# Patient Record
Sex: FEMALE | Race: WHITE | NOT HISPANIC OR LATINO
[De-identification: names, ages, dates, MRNs, and addresses within clinical notes are randomized per-mention and may not be internally consistent; named-entity substitution may affect disease eponyms.]

---

## 2018-02-22 ENCOUNTER — RECORDS - HEALTHEAST (OUTPATIENT)
Dept: ADMINISTRATIVE | Facility: OTHER | Age: 53
End: 2018-02-22

## 2018-03-15 ENCOUNTER — RECORDS - HEALTHEAST (OUTPATIENT)
Dept: ADMINISTRATIVE | Facility: OTHER | Age: 53
End: 2018-03-15

## 2018-03-16 ENCOUNTER — RECORDS - HEALTHEAST (OUTPATIENT)
Dept: ADMINISTRATIVE | Facility: OTHER | Age: 53
End: 2018-03-16

## 2018-03-19 ENCOUNTER — COMMUNICATION - HEALTHEAST (OUTPATIENT)
Dept: ADMINISTRATIVE | Facility: CLINIC | Age: 53
End: 2018-03-19

## 2018-04-11 ENCOUNTER — OFFICE VISIT - HEALTHEAST (OUTPATIENT)
Dept: INFECTIOUS DISEASES | Facility: CLINIC | Age: 53
End: 2018-04-11

## 2018-04-11 ENCOUNTER — AMBULATORY - HEALTHEAST (OUTPATIENT)
Dept: LAB | Facility: CLINIC | Age: 53
End: 2018-04-11

## 2018-04-11 ENCOUNTER — COMMUNICATION - HEALTHEAST (OUTPATIENT)
Dept: TELEHEALTH | Facility: CLINIC | Age: 53
End: 2018-04-11

## 2018-04-11 DIAGNOSIS — B25.9 CMV COLITIS (H): ICD-10-CM

## 2018-04-11 DIAGNOSIS — A08.39 CMV COLITIS (H): ICD-10-CM

## 2018-04-11 DIAGNOSIS — Z94.0 H/O KIDNEY TRANSPLANT: ICD-10-CM

## 2018-04-11 LAB — C REACTIVE PROTEIN LHE: 9.9 MG/DL (ref 0–0.8)

## 2018-04-11 RX ORDER — FUROSEMIDE 40 MG
40 TABLET ORAL 2 TIMES DAILY
Status: SHIPPED | COMMUNITY
Start: 2018-03-26

## 2018-04-11 RX ORDER — AMLODIPINE BESYLATE 5 MG/1
5 TABLET ORAL DAILY
Status: SHIPPED | COMMUNITY
Start: 2018-04-11

## 2018-04-11 RX ORDER — CODEINE PHOSPHATE AND GUAIFENESIN 10; 100 MG/5ML; MG/5ML
SOLUTION ORAL
Status: SHIPPED | COMMUNITY
Start: 2018-04-11

## 2018-04-11 ASSESSMENT — MIFFLIN-ST. JEOR: SCORE: 1066.25

## 2018-04-12 ENCOUNTER — COMMUNICATION - HEALTHEAST (OUTPATIENT)
Dept: ADMINISTRATIVE | Facility: CLINIC | Age: 53
End: 2018-04-12

## 2018-04-12 LAB
CMV DNA SPECIMEN TYPE: ABNORMAL
CMV QUANT IU/ML: <137 [IU]/ML
LOG IU/ML OF CMVQNT: <2.1 {LOG_IU}/ML

## 2018-04-19 ENCOUNTER — AMBULATORY - HEALTHEAST (OUTPATIENT)
Dept: LAB | Facility: HOSPITAL | Age: 53
End: 2018-04-19

## 2018-04-19 DIAGNOSIS — Z94.0 H/O KIDNEY TRANSPLANT: ICD-10-CM

## 2018-04-19 DIAGNOSIS — A08.39 CMV COLITIS (H): ICD-10-CM

## 2018-04-19 DIAGNOSIS — B25.9 CMV COLITIS (H): ICD-10-CM

## 2018-04-19 LAB
ANION GAP SERPL CALCULATED.3IONS-SCNC: 14 MMOL/L (ref 5–18)
BASOPHILS # BLD AUTO: 0 THOU/UL (ref 0–0.2)
BASOPHILS NFR BLD AUTO: 0 % (ref 0–2)
BUN SERPL-MCNC: 87 MG/DL (ref 8–22)
C REACTIVE PROTEIN LHE: 9.3 MG/DL (ref 0–0.8)
CALCIUM SERPL-MCNC: 7.5 MG/DL (ref 8.5–10.5)
CHLORIDE BLD-SCNC: 108 MMOL/L (ref 98–107)
CO2 SERPL-SCNC: 17 MMOL/L (ref 22–31)
CREAT SERPL-MCNC: 1.99 MG/DL (ref 0.6–1.1)
EOSINOPHIL # BLD AUTO: 0 THOU/UL (ref 0–0.4)
EOSINOPHIL NFR BLD AUTO: 0 % (ref 0–6)
ERYTHROCYTE [DISTWIDTH] IN BLOOD BY AUTOMATED COUNT: 18.1 % (ref 11–14.5)
GFR SERPL CREATININE-BSD FRML MDRD: 26 ML/MIN/1.73M2
GLUCOSE BLD-MCNC: 103 MG/DL (ref 70–125)
HCT VFR BLD AUTO: 32.3 % (ref 35–47)
HGB BLD-MCNC: 9.3 G/DL (ref 12–16)
LYMPHOCYTES # BLD AUTO: 2.5 THOU/UL (ref 0.8–4.4)
LYMPHOCYTES NFR BLD AUTO: 41 % (ref 20–40)
MCH RBC QN AUTO: 23.4 PG (ref 27–34)
MCHC RBC AUTO-ENTMCNC: 28.8 G/DL (ref 32–36)
MCV RBC AUTO: 81 FL (ref 80–100)
MONOCYTES # BLD AUTO: 0.2 THOU/UL (ref 0–0.9)
MONOCYTES NFR BLD AUTO: 4 % (ref 2–10)
NEUTROPHILS # BLD AUTO: 3.2 THOU/UL (ref 2–7.7)
NEUTROPHILS NFR BLD AUTO: 54 % (ref 50–70)
PLATELET # BLD AUTO: 251 THOU/UL (ref 140–440)
PMV BLD AUTO: 7.9 FL (ref 8.5–12.5)
POTASSIUM BLD-SCNC: 5.1 MMOL/L (ref 3.5–5)
RBC # BLD AUTO: 3.97 MILL/UL (ref 3.8–5.4)
SODIUM SERPL-SCNC: 139 MMOL/L (ref 136–145)
WBC: 5.9 THOU/UL (ref 4–11)

## 2018-04-20 LAB
CMV DNA SPECIMEN TYPE: NORMAL
CMV QUANT IU/ML: NORMAL [IU]/ML
LOG IU/ML OF CMVQNT: NORMAL {LOG_IU}/ML

## 2018-04-26 ENCOUNTER — AMBULATORY - HEALTHEAST (OUTPATIENT)
Dept: LAB | Facility: HOSPITAL | Age: 53
End: 2018-04-26

## 2018-04-26 DIAGNOSIS — B25.9 CMV COLITIS (H): ICD-10-CM

## 2018-04-26 DIAGNOSIS — Z94.0 H/O KIDNEY TRANSPLANT: ICD-10-CM

## 2018-04-26 DIAGNOSIS — A08.39 CMV COLITIS (H): ICD-10-CM

## 2018-04-26 LAB
ANION GAP SERPL CALCULATED.3IONS-SCNC: 13 MMOL/L (ref 5–18)
BASOPHILS # BLD AUTO: 0 THOU/UL (ref 0–0.2)
BASOPHILS NFR BLD AUTO: 0 % (ref 0–2)
BUN SERPL-MCNC: 83 MG/DL (ref 8–22)
C REACTIVE PROTEIN LHE: 7.4 MG/DL (ref 0–0.8)
CALCIUM SERPL-MCNC: 7.7 MG/DL (ref 8.5–10.5)
CHLORIDE BLD-SCNC: 108 MMOL/L (ref 98–107)
CMV DNA SPECIMEN TYPE: NORMAL
CMV QUANT IU/ML: NORMAL [IU]/ML
CO2 SERPL-SCNC: 21 MMOL/L (ref 22–31)
CREAT SERPL-MCNC: 1.99 MG/DL (ref 0.6–1.1)
EOSINOPHIL # BLD AUTO: 0 THOU/UL (ref 0–0.4)
EOSINOPHIL NFR BLD AUTO: 0 % (ref 0–6)
ERYTHROCYTE [DISTWIDTH] IN BLOOD BY AUTOMATED COUNT: 17.7 % (ref 11–14.5)
GFR SERPL CREATININE-BSD FRML MDRD: 26 ML/MIN/1.73M2
GLUCOSE BLD-MCNC: 122 MG/DL (ref 70–125)
HCT VFR BLD AUTO: 34.2 % (ref 35–47)
HGB BLD-MCNC: 9.8 G/DL (ref 12–16)
LOG IU/ML OF CMVQNT: NORMAL {LOG_IU}/ML
LYMPHOCYTES # BLD AUTO: 1.5 THOU/UL (ref 0.8–4.4)
LYMPHOCYTES NFR BLD AUTO: 27 % (ref 20–40)
MCH RBC QN AUTO: 23.7 PG (ref 27–34)
MCHC RBC AUTO-ENTMCNC: 28.7 G/DL (ref 32–36)
MCV RBC AUTO: 83 FL (ref 80–100)
MONOCYTES # BLD AUTO: 0.1 THOU/UL (ref 0–0.9)
MONOCYTES NFR BLD AUTO: 2 % (ref 2–10)
NEUTROPHILS # BLD AUTO: 3.9 THOU/UL (ref 2–7.7)
NEUTROPHILS NFR BLD AUTO: 70 % (ref 50–70)
PLATELET # BLD AUTO: 270 THOU/UL (ref 140–440)
PMV BLD AUTO: 7.8 FL (ref 8.5–12.5)
POTASSIUM BLD-SCNC: 4.8 MMOL/L (ref 3.5–5)
RBC # BLD AUTO: 4.14 MILL/UL (ref 3.8–5.4)
SODIUM SERPL-SCNC: 142 MMOL/L (ref 136–145)
WBC: 5.6 THOU/UL (ref 4–11)

## 2018-05-03 ENCOUNTER — AMBULATORY - HEALTHEAST (OUTPATIENT)
Dept: LAB | Facility: HOSPITAL | Age: 53
End: 2018-05-03

## 2018-05-03 DIAGNOSIS — B25.9 CMV COLITIS (H): ICD-10-CM

## 2018-05-03 DIAGNOSIS — A08.39 CMV COLITIS (H): ICD-10-CM

## 2018-05-03 DIAGNOSIS — Z94.0 H/O KIDNEY TRANSPLANT: ICD-10-CM

## 2018-05-03 LAB
ANION GAP SERPL CALCULATED.3IONS-SCNC: 12 MMOL/L (ref 5–18)
BASOPHILS # BLD AUTO: 0 THOU/UL (ref 0–0.2)
BASOPHILS NFR BLD AUTO: 0 % (ref 0–2)
BUN SERPL-MCNC: 83 MG/DL (ref 8–22)
C REACTIVE PROTEIN LHE: 10.2 MG/DL (ref 0–0.8)
CALCIUM SERPL-MCNC: 7.5 MG/DL (ref 8.5–10.5)
CHLORIDE BLD-SCNC: 104 MMOL/L (ref 98–107)
CO2 SERPL-SCNC: 22 MMOL/L (ref 22–31)
CREAT SERPL-MCNC: 2.24 MG/DL (ref 0.6–1.1)
EOSINOPHIL # BLD AUTO: 0 THOU/UL (ref 0–0.4)
EOSINOPHIL NFR BLD AUTO: 0 % (ref 0–6)
ERYTHROCYTE [DISTWIDTH] IN BLOOD BY AUTOMATED COUNT: 18.7 % (ref 11–14.5)
GFR SERPL CREATININE-BSD FRML MDRD: 23 ML/MIN/1.73M2
GLUCOSE BLD-MCNC: 144 MG/DL (ref 70–125)
HCT VFR BLD AUTO: 31.9 % (ref 35–47)
HGB BLD-MCNC: 9.1 G/DL (ref 12–16)
LYMPHOCYTES # BLD AUTO: 1.9 THOU/UL (ref 0.8–4.4)
LYMPHOCYTES NFR BLD AUTO: 37 % (ref 20–40)
MCH RBC QN AUTO: 23.7 PG (ref 27–34)
MCHC RBC AUTO-ENTMCNC: 28.5 G/DL (ref 32–36)
MCV RBC AUTO: 83 FL (ref 80–100)
MONOCYTES # BLD AUTO: 0.2 THOU/UL (ref 0–0.9)
MONOCYTES NFR BLD AUTO: 4 % (ref 2–10)
NEUTROPHILS # BLD AUTO: 3.1 THOU/UL (ref 2–7.7)
NEUTROPHILS NFR BLD AUTO: 59 % (ref 50–70)
PLATELET # BLD AUTO: 331 THOU/UL (ref 140–440)
PMV BLD AUTO: 8.8 FL (ref 8.5–12.5)
POTASSIUM BLD-SCNC: 5.7 MMOL/L (ref 3.5–5)
RBC # BLD AUTO: 3.84 MILL/UL (ref 3.8–5.4)
SODIUM SERPL-SCNC: 138 MMOL/L (ref 136–145)
WBC: 5.3 THOU/UL (ref 4–11)

## 2018-05-09 ENCOUNTER — AMBULATORY - HEALTHEAST (OUTPATIENT)
Dept: LAB | Facility: HOSPITAL | Age: 53
End: 2018-05-09

## 2018-05-09 ENCOUNTER — OFFICE VISIT - HEALTHEAST (OUTPATIENT)
Dept: INFECTIOUS DISEASES | Facility: CLINIC | Age: 53
End: 2018-05-09

## 2018-05-09 DIAGNOSIS — A08.39 CMV COLITIS (H): ICD-10-CM

## 2018-05-09 DIAGNOSIS — B25.9 CMV COLITIS (H): ICD-10-CM

## 2018-05-09 LAB
ANION GAP SERPL CALCULATED.3IONS-SCNC: 12 MMOL/L (ref 5–18)
BASOPHILS # BLD AUTO: 0 THOU/UL (ref 0–0.2)
BASOPHILS NFR BLD AUTO: 0 % (ref 0–2)
BUN SERPL-MCNC: 75 MG/DL (ref 8–22)
C REACTIVE PROTEIN LHE: 9.8 MG/DL (ref 0–0.8)
CALCIUM SERPL-MCNC: 8.1 MG/DL (ref 8.5–10.5)
CHLORIDE BLD-SCNC: 106 MMOL/L (ref 98–107)
CO2 SERPL-SCNC: 23 MMOL/L (ref 22–31)
CREAT SERPL-MCNC: 2.17 MG/DL (ref 0.6–1.1)
EOSINOPHIL # BLD AUTO: 0 THOU/UL (ref 0–0.4)
EOSINOPHIL NFR BLD AUTO: 0 % (ref 0–6)
ERYTHROCYTE [DISTWIDTH] IN BLOOD BY AUTOMATED COUNT: 19.7 % (ref 11–14.5)
GFR SERPL CREATININE-BSD FRML MDRD: 24 ML/MIN/1.73M2
GLUCOSE BLD-MCNC: 97 MG/DL (ref 70–125)
HCT VFR BLD AUTO: 34.6 % (ref 35–47)
HGB BLD-MCNC: 10 G/DL (ref 12–16)
LYMPHOCYTES # BLD AUTO: 1.9 THOU/UL (ref 0.8–4.4)
LYMPHOCYTES NFR BLD AUTO: 26 % (ref 20–40)
MCH RBC QN AUTO: 24.1 PG (ref 27–34)
MCHC RBC AUTO-ENTMCNC: 28.9 G/DL (ref 32–36)
MCV RBC AUTO: 83 FL (ref 80–100)
MONOCYTES # BLD AUTO: 0.2 THOU/UL (ref 0–0.9)
MONOCYTES NFR BLD AUTO: 2 % (ref 2–10)
NEUTROPHILS # BLD AUTO: 5.1 THOU/UL (ref 2–7.7)
NEUTROPHILS NFR BLD AUTO: 71 % (ref 50–70)
PLATELET # BLD AUTO: 360 THOU/UL (ref 140–440)
PMV BLD AUTO: 8.1 FL (ref 8.5–12.5)
POTASSIUM BLD-SCNC: 5.4 MMOL/L (ref 3.5–5)
RBC # BLD AUTO: 4.15 MILL/UL (ref 3.8–5.4)
SODIUM SERPL-SCNC: 141 MMOL/L (ref 136–145)
WBC: 7.2 THOU/UL (ref 4–11)

## 2018-05-09 RX ORDER — VALGANCICLOVIR 450 MG/1
450 TABLET, FILM COATED ORAL DAILY
Qty: 30 TABLET | Refills: 1 | Status: SHIPPED | OUTPATIENT
Start: 2018-05-09

## 2018-05-09 RX ORDER — CINACALCET 60 MG/1
60 TABLET, FILM COATED ORAL DAILY
Status: SHIPPED | COMMUNITY
Start: 2018-05-09

## 2018-05-09 ASSESSMENT — MIFFLIN-ST. JEOR: SCORE: 1068.97

## 2018-05-17 ENCOUNTER — AMBULATORY - HEALTHEAST (OUTPATIENT)
Dept: LAB | Facility: HOSPITAL | Age: 53
End: 2018-05-17

## 2018-05-17 DIAGNOSIS — A08.39 CMV COLITIS (H): ICD-10-CM

## 2018-05-17 DIAGNOSIS — B25.9 CMV COLITIS (H): ICD-10-CM

## 2018-05-17 LAB
ANION GAP SERPL CALCULATED.3IONS-SCNC: 11 MMOL/L (ref 5–18)
BASOPHILS # BLD AUTO: 0 THOU/UL (ref 0–0.2)
BASOPHILS NFR BLD AUTO: 0 % (ref 0–2)
BUN SERPL-MCNC: 72 MG/DL (ref 8–22)
C REACTIVE PROTEIN LHE: 8 MG/DL (ref 0–0.8)
CALCIUM SERPL-MCNC: 8.2 MG/DL (ref 8.5–10.5)
CHLORIDE BLD-SCNC: 105 MMOL/L (ref 98–107)
CO2 SERPL-SCNC: 24 MMOL/L (ref 22–31)
CREAT SERPL-MCNC: 2.15 MG/DL (ref 0.6–1.1)
EOSINOPHIL # BLD AUTO: 0 THOU/UL (ref 0–0.4)
EOSINOPHIL NFR BLD AUTO: 0 % (ref 0–6)
ERYTHROCYTE [DISTWIDTH] IN BLOOD BY AUTOMATED COUNT: 20.9 % (ref 11–14.5)
GFR SERPL CREATININE-BSD FRML MDRD: 24 ML/MIN/1.73M2
GLUCOSE BLD-MCNC: 110 MG/DL (ref 70–125)
HCT VFR BLD AUTO: 32.8 % (ref 35–47)
HGB BLD-MCNC: 9.4 G/DL (ref 12–16)
LYMPHOCYTES # BLD AUTO: 1.6 THOU/UL (ref 0.8–4.4)
LYMPHOCYTES NFR BLD AUTO: 36 % (ref 20–40)
MCH RBC QN AUTO: 24.4 PG (ref 27–34)
MCHC RBC AUTO-ENTMCNC: 28.7 G/DL (ref 32–36)
MCV RBC AUTO: 85 FL (ref 80–100)
MONOCYTES # BLD AUTO: 0.1 THOU/UL (ref 0–0.9)
MONOCYTES NFR BLD AUTO: 3 % (ref 2–10)
NEUTROPHILS # BLD AUTO: 2.7 THOU/UL (ref 2–7.7)
NEUTROPHILS NFR BLD AUTO: 61 % (ref 50–70)
OVALOCYTES: ABNORMAL
PLAT MORPH BLD: NORMAL
PLATELET # BLD AUTO: 291 THOU/UL (ref 140–440)
PMV BLD AUTO: 7.9 FL (ref 8.5–12.5)
POLYCHROMASIA BLD QL SMEAR: ABNORMAL
POTASSIUM BLD-SCNC: 5.4 MMOL/L (ref 3.5–5)
RBC # BLD AUTO: 3.86 MILL/UL (ref 3.8–5.4)
SODIUM SERPL-SCNC: 140 MMOL/L (ref 136–145)
WBC: 4.5 THOU/UL (ref 4–11)

## 2018-05-23 ENCOUNTER — AMBULATORY - HEALTHEAST (OUTPATIENT)
Dept: LAB | Facility: HOSPITAL | Age: 53
End: 2018-05-23

## 2018-05-23 DIAGNOSIS — B25.9 CMV COLITIS (H): ICD-10-CM

## 2018-05-23 DIAGNOSIS — A08.39 CMV COLITIS (H): ICD-10-CM

## 2018-05-23 LAB
ANION GAP SERPL CALCULATED.3IONS-SCNC: 12 MMOL/L (ref 5–18)
BASOPHILS # BLD AUTO: 0 THOU/UL (ref 0–0.2)
BASOPHILS NFR BLD AUTO: 0 % (ref 0–2)
BUN SERPL-MCNC: 87 MG/DL (ref 8–22)
C REACTIVE PROTEIN LHE: 7 MG/DL (ref 0–0.8)
CALCIUM SERPL-MCNC: 8 MG/DL (ref 8.5–10.5)
CHLORIDE BLD-SCNC: 107 MMOL/L (ref 98–107)
CO2 SERPL-SCNC: 21 MMOL/L (ref 22–31)
CREAT SERPL-MCNC: 2.31 MG/DL (ref 0.6–1.1)
EOSINOPHIL # BLD AUTO: 0 THOU/UL (ref 0–0.4)
EOSINOPHIL NFR BLD AUTO: 0 % (ref 0–6)
ERYTHROCYTE [DISTWIDTH] IN BLOOD BY AUTOMATED COUNT: 21.6 % (ref 11–14.5)
GFR SERPL CREATININE-BSD FRML MDRD: 22 ML/MIN/1.73M2
GLUCOSE BLD-MCNC: 140 MG/DL (ref 70–125)
HCT VFR BLD AUTO: 31.1 % (ref 35–47)
HGB BLD-MCNC: 9.1 G/DL (ref 12–16)
LYMPHOCYTES # BLD AUTO: 2 THOU/UL (ref 0.8–4.4)
LYMPHOCYTES NFR BLD AUTO: 37 % (ref 20–40)
MCH RBC QN AUTO: 25 PG (ref 27–34)
MCHC RBC AUTO-ENTMCNC: 29.3 G/DL (ref 32–36)
MCV RBC AUTO: 85 FL (ref 80–100)
MONOCYTES # BLD AUTO: 0.1 THOU/UL (ref 0–0.9)
MONOCYTES NFR BLD AUTO: 2 % (ref 2–10)
NEUTROPHILS # BLD AUTO: 3.3 THOU/UL (ref 2–7.7)
NEUTROPHILS NFR BLD AUTO: 61 % (ref 50–70)
PLATELET # BLD AUTO: 309 THOU/UL (ref 140–440)
PMV BLD AUTO: 8.1 FL (ref 8.5–12.5)
POTASSIUM BLD-SCNC: 5.5 MMOL/L (ref 3.5–5)
RBC # BLD AUTO: 3.64 MILL/UL (ref 3.8–5.4)
SODIUM SERPL-SCNC: 140 MMOL/L (ref 136–145)
WBC: 5.5 THOU/UL (ref 4–11)

## 2018-05-30 ENCOUNTER — RECORDS - HEALTHEAST (OUTPATIENT)
Dept: ADMINISTRATIVE | Facility: OTHER | Age: 53
End: 2018-05-30

## 2018-06-13 ENCOUNTER — RECORDS - HEALTHEAST (OUTPATIENT)
Dept: ADMINISTRATIVE | Facility: OTHER | Age: 53
End: 2018-06-13

## 2018-06-13 ENCOUNTER — COMMUNICATION - HEALTHEAST (OUTPATIENT)
Dept: INFECTIOUS DISEASES | Facility: CLINIC | Age: 53
End: 2018-06-13

## 2018-06-14 ENCOUNTER — RECORDS - HEALTHEAST (OUTPATIENT)
Dept: ADMINISTRATIVE | Facility: OTHER | Age: 53
End: 2018-06-14

## 2018-06-27 ENCOUNTER — COMMUNICATION - HEALTHEAST (OUTPATIENT)
Dept: INFECTIOUS DISEASES | Facility: CLINIC | Age: 53
End: 2018-06-27

## 2018-07-02 ENCOUNTER — RECORDS - HEALTHEAST (OUTPATIENT)
Dept: ADMINISTRATIVE | Facility: OTHER | Age: 53
End: 2018-07-02

## 2018-07-18 ENCOUNTER — COMMUNICATION - HEALTHEAST (OUTPATIENT)
Dept: INFECTIOUS DISEASES | Facility: CLINIC | Age: 53
End: 2018-07-18

## 2021-06-01 VITALS — BODY MASS INDEX: 22.54 KG/M2 | HEIGHT: 61 IN | WEIGHT: 119.4 LBS

## 2021-06-01 VITALS — HEIGHT: 61 IN | WEIGHT: 118.8 LBS | BODY MASS INDEX: 22.43 KG/M2

## 2021-06-16 PROBLEM — A08.39 CMV COLITIS (H): Status: ACTIVE | Noted: 2018-04-11

## 2021-06-16 PROBLEM — B25.9 CMV COLITIS (H): Status: ACTIVE | Noted: 2018-04-11

## 2021-06-17 NOTE — PROGRESS NOTES
[facility] INFECTIOUS DISEASE CLINIC INITIAL CONSULTATION    Date: 4/12/2018  Patient Name: Meka Eastman   YOB: 1965  MRN: [id number]      ASSESSMENT:  1. 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 History of CMV colitis in 2016 when she presented with perforation. Underwent sigmoid colectomy at that time. Now with changes seen on CT since November 2017. Colonoscopy March 2018 showing diffuse colitis and biopsy is positive for CMV inclusions seen on H&E stain. She has symptoms of not eating well, intermittent abdominal pain, weight loss. We discussed treatment with IV ganciclovir vs oral valganciclovir. There is most experience with IV ganciclovir as induction therapy, but valganciclovir has good oral bioavailability and should work well. She and her caregiver where hesitant to have PICC placed, so will plan for oral valganciclovir.   2. Sigmoid colectomy Feb 2016 as above.  3. Renal transplant 1998, immunosuppressed on prednisone 10mg daily, cyclosporine, MMF.  CKD with estimated creatinine clearance of 22 last check.   4. 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  5. Developmental delay congenital rubella syndrome  6. H/o CVA. Anticoagulated  7. H/o seizure.       PLAN:  Potential for increased ganciclovir levels when valganciclovir is use with MMF -- monitor for signs of toxicity -- nausea, vomiting, cytopenias.   Prescription sent for 450mg daily, although after review of her most recent creatinine, the best dose is 450mg every other day. I discussed this with staff member at her [facility] and will fax order to .   Weekly labs while on valganciclovir. If CMV PCR in blood is detectable can follow this on treatment.   Return to clinic in 4 weeks after repeat CT.    Milan Marcus MD  [facility]   Clinic phone: [phone]   Clinic fax:  [phone]    ______________________________________________________________________    HISTORY OF PRESENT ILLNESS: Meka Eastman is a 52 y.o. woman who is referred for evaluation of CMV colitis    She has a history of renal transplant in 1998, congenital heart disease with pacemaker in place, h/o CVA, depression and history of sigmoid resection secondary to bowel perforation and due to cytomegalovirus infection in February 2016.    Presented February 2016 with abdominal pain and was found to have free air under diaphragm on CT abdomen, underwent exploratory laparotomy 2/24/16 which revealed thickened sigmoid colon, so underwent resection. Pathology report is reviewed in CareEverywhere, see below. No record of antiviral treatment that I see.     She has followed with Dr. Salvador at [facility] surgery for potential plans for takedown of colostomy at some point. In preparation for this she had a CT in November 2017 which showed wall thickening of terminal ileum, this was repeated on 1/16/18 and this showed wall thickening of terminal ileum and portion of the contiguous cecum, little changed compared to 11/7/17. She had colonoscopy on 3/15/18 by Dr. Rahman. There was inflammation noted in the entire colon. Pathology of colon biopsy revealed CMV viral cytopathic effect identified on H&E stain.     Symptoms now consist of weakness, not eating well, feels like she gets full fast, not exercising. Symptoms seem worse over past couple of weeks. Had some breakdown of ostomy site last year but this has improved. Has had loose stool in ostomy since its creation. No blood. Admits to abdominal pain, may be after eating. Pain maybe every other day for about past month. Has gassiness. Has lost about 10 lbs.     Follows at [facility], has ureteral stent changed about every 6 months. Dr. Izquierdo, Urology.     Lives in Group Home in [place].     No fever, chills, sweats. Caregiver is here with her to help provide history.     Past Medical  History:   Diagnosis Date     Anemia of chronic kidney failure 01/10/2014     Atrial fibrillation     Chronic anti-coagulation     Calculus of gallbladder with acute cholecystitis and obstruction 03/27/2008     CKD (chronic kidney disease), stage 4 (severe) 07/13/2007    Transplant- 1998 d/t reflux     Congenital rubella 07/11/2007     CVA (cerebral infarction) 01/10/2014    Left sided weakness     Ebstein's anomaly 07/11/2007     Expressive language disorder 07/11/2007     History of renal transplant      HTN (hypertension) 07/14/2007     Hydronephrosis 08/21/2014     Hyperlipidemia 07/15/2007     Hyperparathyroidism 07/11/2007     Hypocalcemia 07/13/2007     Hypokalemia      Major depressive disorder      Osteoporosis 07/11/2007     Pacemaker 08/20/2012    Patent foramen ovale     Pancytopenia     D/T- Anti-rejection medications     Perforated bowel     D/T- Sigmoid diverticulitis     Petechiae 09/01/2006     PFO (patent foramen ovale) 01/10/2014    Extremely large- seen on cardiac MRI- 8/6/12     Pyelonephritis 08/21/2014     Syncope and collapse      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     Urethral stenosis     S/P- Stent replacements every 3 months       Past Surgical History:   Procedure Laterality Date     APPENDECTOMY  04/08/2008     BRONCHOSCOPY  12/20/2007    Tracheomalacia     CARDIAC PACEMAKER PLACEMENT  08/2012     COLECTOMY  02/24/2016    Sigmoid     COLOSTOMY  02/24/2016    Sigmoid     FLEXIBLE SIGMOIDOSCOPY  04/02/2008     LEG SURGERY Left     Reduction with pin placement     NEPHRECTOMY TRANSPLANTED ORGAN  1998     PARATHYROIDECTOMY      Adenome     URETERAL STENT PLACEMENT Left     Changed every 3 months       Social History     Social History     Marital status: Single     Spouse name: N/A     Number of children: N/A     Years of education: N/A     Occupational History     Not on file.     Social History Main Topics     Smoking status: Never Smoker     Smokeless tobacco: Never Used     Alcohol use No      Drug use: Not on file     Sexual activity: Not on file     Other Topics Concern     Not on file     Social History Narrative      FH: no immunodeficiency known.     ROS: All other systems negative except as listed above.      Current Outpatient Prescriptions:      acetaminophen (TYLENOL) 500 MG tablet, Take 1,000 mg by mouth every 6 (six) hours as needed for pain (Chills)., Disp: , Rfl:      calcitriol (ROCALTROL) 0.5 MCG capsule, Take 0.5 mcg by mouth daily., Disp: , Rfl:      cholecalciferol, vitamin D3, 2,000 unit cap, Take 2,000 Units by mouth daily., Disp: , Rfl:      codeine-guaiFENesin (CHERATUSSIN AC)  mg/5 mL liquid, Take 10-20 mL prn, Disp: , Rfl:      cycloSPORINE modified (GENGRAF) 100 MG capsule, Take 100 mg by mouth every 12 (twelve) hours., Disp: , Rfl:      esomeprazole (NEXIUM) 40 MG capsule, Take 40 mg by mouth 2 (two) times a day., Disp: , Rfl:      furosemide (LASIX) 40 MG tablet, Take 40 mg by mouth 2 (two) times a day., Disp: , Rfl:      magnesium oxide (MAG-OX) 400 mg tablet, Take 400 mg by mouth daily., Disp: , Rfl:      metoprolol tartrate (LOPRESSOR) 25 MG tablet, Take 12.5 mg by mouth 2 (two) times a day., Disp: , Rfl:      mometasone (NASONEX) 50 mcg/actuation nasal spray, 1 spray into each nostril daily., Disp: , Rfl:      mycophenolate (CELLCEPT) 500 mg tablet, Take 500 mg by mouth 2 (two) times a day., Disp: , Rfl:      phenytoin (DILANTIN) 100 MG ER capsule, Take 300 mg by mouth bedtime., Disp: , Rfl:      pravastatin (PRAVACHOL) 20 MG tablet, Take 20 mg by mouth every morning., Disp: , Rfl:      predniSONE (DELTASONE) 10 MG tablet, Take 10 mg by mouth 2 (two) times a day with meals., Disp: , Rfl:      sertraline (ZOLOFT) 50 MG tablet, Take 175 mg by mouth daily., Disp: , Rfl:      SODIUM BICARBONATE ORAL, Take 2 tablet bid, Disp: , Rfl:      warfarin (COUMADIN) 2.5 MG tablet, Take 1.25 mg by mouth daily., Disp: , Rfl:      amLODIPine (NORVASC) 5 MG tablet, Take 5 mg by  "mouth daily., Disp: , Rfl:      valGANciclovir (VALCYTE) 450 mg tablet, Take 1 tablet (450 mg total) by mouth daily., Disp: 30 tablet, Rfl: 1      OBJECTIVE:  Vitals:    04/11/18 1052   BP: 108/70   Temp: 98.3  F (36.8  C)         GEN: No acute distress. Verbal but does not give much history.   HEENT: conjunctiva clear, pupils react to light, oropharynx clear, aged dentition  NECK: supple   RESPIRATORY:  Normal breathing pattern. Clear to auscultation  CARDIOVASCULAR:  Regular rate and rhythm. Normal S1 and S2. No murmur, click, gallop or rub.   Pacemaker.   ABDOMEN:  Soft, mildly tender in area of colostomy. Normal bowel sounds.   MUSCULOSKELETAL: No synovitis.  EXTREMITIES: No edema.  No cervical, supraclavicular, axillary, or inguinal lymphadenopathy  SKIN/HAIR/NAILS:  No rashes. No stigmata of endocarditis. Some lower extremity edema.   NEURO: no focal findings.           Pertinent labs:  Last creatinine in CareEverywhere 2.3 with GFR estimated at 22.           Lab Results   Component Value Date    CRP 9.9 (H) 04/11/2018       Pathology 2/24/16  A) SIGMOID COLON, RESECTION:  1. Cytomegalovirus (CMV) colitis with diffuse ulceration  2. Negative for diverticula and malignancy      Comment Dr. Mcginnis provided these results to the OR nurse while Dr. Salvador was scrubbed in on 2/26/16 at 3:06 pm.     Clinical Information Sigmoid diverticulitis and perforation     Gross Description A) Received in formalin labeled \"sigmoid colon\" is an 8.5 cm long, 3.5 cm diameter nonoriented segment of large bowel with adherent fibrinopurulent exudate. Both ends are stapled closed. There are no obvious areas of discrete perforation present, grossly.     Specimen is opened lengthwise revealing a circumscribed area of mucosal ulceration with overlying fibrinous exudate, measuring 9 x 4.3 cm. The ulceration has an erythematous border and comes to within 2 cm of both resection margins. There is no mass associated with the area of " ulceration. No obvious diverticula are present in the remainder of the specimen or within the area of ulceration. On cut section, there is no discrete perforation identified.    Representative sections are submitted in seven cassettes labeled:   1.   Margins, en face  2,3. Transitions from more normal-appearing mucosa to area of ulcerated mucosa with exudate  4-6. Additional sections through area of ulcerated mucosa with overlying exudate, including serosal exudate  7.   Four possible (representative) lymph nodes          MICROBIOLOGY DATA:  CMV PCR of blood pending.     RADIOLOGY:  Reviewed results from previous scans through [facility] radiology portal  CT 1/17/18:  1. Transplant ureteral stent  2. Wall thickening of terminal ileum and a portion of the continuous cecum is little changed from 11/7/17. May be on basis of an inflammatory or infectious etiology. Neoplastic cause not excluded.   3. Inflammatory changes and small nodular soft tissue density of the presacral fat have progressed from previous. This may be explained by evolution of surgical changes after partial sigmoid colectomy. A proliferative process is in the differential diagnosis.

## 2021-06-17 NOTE — PROGRESS NOTES
[facility] INFECTIOUS DISEASE CLINIC FOLLOW UP NOTE    Date: 5/11/2018  Patient Name: Meka Eastman   YOB: 1965  MRN: [id number]      ASSESSMENT:  1. 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 History of CMV colitis in 2016 when she presented with perforation. Underwent sigmoid colectomy at that time. Now with changes seen on CT since November 2017. Colonoscopy March 2018 showing diffuse colitis and biopsy is positive for CMV inclusions seen on H&E stain. Opted for oral valganciclovir, started on 4/12. Symptoms are improved. CRP remains elevated. CT showing unchanged inflammation, but this may lag clinical response.   2. Sigmoid colectomy Feb 2016 as above.  3. Renal transplant 1998, immunosuppressed on prednisone 10mg daily, cyclosporine, MMF.  CKD with estimated creatinine clearance of 22 last check.   4. 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  5. Developmental delay congenital rubella syndrome  6. H/o CVA. Anticoagulated  7. H/o seizure.   8. Question of pneumonia as seen on CT abdomen lung views. She has chronic cough that is unchanged. Denies fever or dyspnea. Will monitor symptoms.     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     Presented February 2016 with  abdominal pain and was found to have free air under diaphragm on CT abdomen, underwent exploratory laparotomy 2/24/16 which revealed thickened sigmoid colon, so underwent resection. Pathology report is reviewed in CareEverywhere, see below. No record of antiviral treatment that I see.      She has followed with Dr. Salvador at [facility] surgery for potential plans for takedown of colostomy at some point. In preparation for this she had a CT in November 2017 which showed wall thickening of terminal ileum, this was repeated on 1/16/18 and this showed wall thickening of terminal ileum and portion of the contiguous cecum, little changed compared to 11/7/17. She had colonoscopy on 3/15/18 by Dr. Rahman. There was inflammation noted in the entire colon. Pathology of colon biopsy revealed CMV viral cytopathic effect identified on H&E stain.      Symptoms this spring have consisted of weakness, not eating well, abdominal pain, weight loss     Follows at [facility] [place], has ureteral stent changed about every 6 months. Dr. Izquierdo, Urology.      Lives in Group Home in [place].     Seen 4/11/18 and started on valganciclovir the next day. Feels better -- improved appetite, energy is a little better. Stool in colostomy remains soft and has been this way since creation of ostomy. Due for stent change in July. Has a chronic cough, this has been stable. Denies worsening, denies dyspnea. Has not had fever, chills, or sweats.         Past Medical History:   Diagnosis Date     Anemia of chronic kidney failure 01/10/2014     Atrial fibrillation     Chronic anti-coagulation     Calculus of gallbladder with acute cholecystitis and obstruction 03/27/2008     CKD (chronic kidney disease), stage 4 (severe) 07/13/2007    Transplant- 1998 d/t reflux     Congenital rubella 07/11/2007     CVA (cerebral infarction) 01/10/2014    Left sided weakness     Ebstein's anomaly 07/11/2007     Expressive language disorder 07/11/2007     History of renal transplant       HTN (hypertension) 07/14/2007     Hydronephrosis 08/21/2014     Hyperlipidemia 07/15/2007     Hyperparathyroidism 07/11/2007     Hypocalcemia 07/13/2007     Hypokalemia      Major depressive disorder      Osteoporosis 07/11/2007     Pacemaker 08/20/2012    Patent foramen ovale     Pancytopenia     D/T- Anti-rejection medications     Perforated bowel     D/T- Sigmoid diverticulitis     Petechiae 09/01/2006     PFO (patent foramen ovale) 01/10/2014    Extremely large- seen on cardiac MRI- 8/6/12     Pyelonephritis 08/21/2014     Syncope and collapse      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     Urethral stenosis     S/P- Stent replacements every 3 months     Past Surgical History:   Procedure Laterality Date     APPENDECTOMY  04/08/2008     BRONCHOSCOPY  12/20/2007    Tracheomalacia     CARDIAC PACEMAKER PLACEMENT  08/2012     COLECTOMY  02/24/2016    Sigmoid     COLOSTOMY  02/24/2016    Sigmoid     FLEXIBLE SIGMOIDOSCOPY  04/02/2008     LEG SURGERY Left     Reduction with pin placement     NEPHRECTOMY TRANSPLANTED ORGAN  1998     PARATHYROIDECTOMY      Adenome     URETERAL STENT PLACEMENT Left     Changed every 3 months         ROS: All other systems negative except as listed above.      Current Outpatient Prescriptions:      acetaminophen (TYLENOL) 500 MG tablet, Take 1,000 mg by mouth every 6 (six) hours as needed for pain (Chills)., Disp: , Rfl:      amLODIPine (NORVASC) 5 MG tablet, Take 5 mg by mouth daily., Disp: , Rfl:      calcitriol (ROCALTROL) 0.5 MCG capsule, Take 0.5 mcg by mouth daily., Disp: , Rfl:      cholecalciferol, vitamin D3, 2,000 unit cap, Take 2,000 Units by mouth daily., Disp: , Rfl:      cinacalcet (SENSIPAR) 60 MG tablet, Take 60 mg by mouth daily., Disp: , Rfl:      codeine-guaiFENesin (CHERATUSSIN AC)  mg/5 mL liquid, Take 10-20 mL prn, Disp: , Rfl:      cycloSPORINE modified (GENGRAF) 100 MG capsule, Take 100 mg by mouth every 12 (twelve) hours., Disp: , Rfl:      esomeprazole (NEXIUM)  40 MG capsule, Take 40 mg by mouth 2 (two) times a day., Disp: , Rfl:      furosemide (LASIX) 40 MG tablet, Take 40 mg by mouth 2 (two) times a day., Disp: , Rfl:      magnesium oxide (MAG-OX) 400 mg tablet, Take 400 mg by mouth daily., Disp: , Rfl:      metoprolol tartrate (LOPRESSOR) 25 MG tablet, Take 12.5 mg by mouth 2 (two) times a day., Disp: , Rfl:      mometasone (NASONEX) 50 mcg/actuation nasal spray, 1 spray into each nostril daily., Disp: , Rfl:      mycophenolate (CELLCEPT) 500 mg tablet, Take 500 mg by mouth 2 (two) times a day., Disp: , Rfl:      phenytoin (DILANTIN) 100 MG ER capsule, Take 300 mg by mouth bedtime., Disp: , Rfl:      pravastatin (PRAVACHOL) 20 MG tablet, Take 20 mg by mouth every morning., Disp: , Rfl:      predniSONE (DELTASONE) 10 MG tablet, Take 10 mg by mouth 2 (two) times a day with meals., Disp: , Rfl:      sertraline (ZOLOFT) 50 MG tablet, Take 175 mg by mouth daily., Disp: , Rfl:      SODIUM BICARBONATE ORAL, Take 2 tablet bid, Disp: , Rfl:      valGANciclovir (VALCYTE) 450 mg tablet, Take 1 tablet (450 mg total) by mouth daily., Disp: 30 tablet, Rfl: 1     warfarin (COUMADIN) 2.5 MG tablet, Take 1.25 mg by mouth daily., Disp: , Rfl:       OBJECTIVE:  Vitals:    05/09/18 1208   BP: 118/72   Pulse: 70   Temp: 98.1  F (36.7  C)   SpO2: 96%         GEN: No acute distress.    RESPIRATORY:  Normal breathing pattern. Clear to auscultation  CARDIOVASCULAR:  Regular rate and rhythm. Normal S1 and S2. No murmur, click, gallop or rub.   ABDOMEN:  Soft, normal bowel sounds, non-tender, colostomy on left.   EXTREMITIES: No edema.  SKIN/HAIR/NAILS:  No rashes          Pertinent labs:      Results from last 7 days  Lab Units 05/09/18  1315   LN-WHITE BLOOD CELL COUNT thou/uL 7.2   LN-HEMOGLOBIN g/dL 10.0*   LN-HEMATOCRIT % 34.6*   LN-PLATELET COUNT thou/uL 360       Results from last 7 days  Lab Units 05/09/18  1315   LN-SODIUM mmol/L 141   LN-POTASSIUM mmol/L 5.4*   LN-CHLORIDE mmol/L 106    LN-CO2 mmol/L 23   LN-BLOOD UREA NITROGEN mg/dL 75*   LN-CREATININE mg/dL 2.17*   LN-CALCIUM mg/dL 8.1*     Lab Results   Component Value Date    CRP 9.8 (H) 05/09/2018           MICROBIOLOGY DATA:  CMV PCR of blood initially positive prior to treatment at <137, then turned negative a week later.     RADIOLOGY:  Reviewed images    CT 5/9:  CONCLUSION:  1.  Transplant kidney with left ureteral stent and mild hydronephrosis similar  to prior imaging.     2.  Marked atrophy of the right kidney with mild right hydronephrosis with  collection of stones distal right ureter as seen previously.     3.  Left kidney is not seen.     4.  NEW PNEUMONIA LEFT LOWER LOBE.     5.  Mild inflammatory thickening terminal ileum similar to previous, overall  poorly characterized without the use of IV contrast. No new inflammatory changes  in the bowel.

## 2023-09-04 NOTE — ADDENDUM NOTE
Addendum Note by Virgen Marcus MD at 4/16/2018 12:20 PM     Author: Virgen Marcus MD Service: -- Author Type: Physician    Filed: 4/16/2018 12:20 PM Encounter Date: 4/11/2018 Status: Signed    : Virgen Marcus MD (Physician)    Addended by: VIRGEN MARCUS on: 4/16/2018 12:20 PM        Modules accepted: Orders         Patient discharged home to self care, provided with paper copy of discharge instructions. Discussed discharge instructions and follow up appointments, patient verbalizes understanding. Vital signs stable, escorted out to ED lobby.